# Patient Record
Sex: MALE
[De-identification: names, ages, dates, MRNs, and addresses within clinical notes are randomized per-mention and may not be internally consistent; named-entity substitution may affect disease eponyms.]

---

## 2020-12-21 ENCOUNTER — APPOINTMENT (OUTPATIENT)
Dept: DERMATOLOGY | Facility: CLINIC | Age: 65
End: 2020-12-21
Payer: COMMERCIAL

## 2020-12-21 DIAGNOSIS — D22.71 MELANOCYTIC NEVI OF RIGHT LOWER LIMB, INCLUDING HIP: ICD-10-CM

## 2020-12-21 DIAGNOSIS — Z80.8 FAMILY HISTORY OF MALIGNANT NEOPLASM OF OTHER ORGANS OR SYSTEMS: ICD-10-CM

## 2020-12-21 DIAGNOSIS — L82.1 OTHER SEBORRHEIC KERATOSIS: ICD-10-CM

## 2020-12-21 DIAGNOSIS — L11.1 TRANSIENT ACANTHOLYTIC DERMATOSIS [GROVER]: ICD-10-CM

## 2020-12-21 PROCEDURE — 99203 OFFICE O/P NEW LOW 30 MIN: CPT

## 2020-12-21 PROCEDURE — 99072 ADDL SUPL MATRL&STAF TM PHE: CPT

## 2020-12-21 RX ORDER — ROSUVASTATIN CALCIUM 5 MG/1
TABLET, FILM COATED ORAL
Refills: 0 | Status: ACTIVE | COMMUNITY

## 2020-12-21 NOTE — ASSESSMENT
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

## 2020-12-21 NOTE — HISTORY OF PRESENT ILLNESS
[FreeTextEntry1] : Evaluation of growths [de-identified] : First visit for 65-year-old white male presented for evaluation of growths. Particularly concerned about a lesion on the chest.  No history of skin cancer.

## 2020-12-21 NOTE — PHYSICAL EXAM
[Alert] : alert [Oriented x 3] : ~L oriented x 3 [Well Nourished] : well nourished [FreeTextEntry3] : The following areas were examined and no significant abnormalities were seen except as noted below:\par \par Type I skin\par \par scalp, face, eyelids, nose, lips, ears, neck, chest, abdomen, back,groin, buttocks, right arm, left arm, right hand, left hand,\par right  leg, left leg, right foot, left foot\par \par Midchest: Moderate multiple 3-5 mm ill-defined pink macules and papules\par Left arm: 4 mm brown verrucous papule\par Few similar lesions present on the right upper back\par Left upper lateral hip: 5 x 4 mm brown macule-not suspicious on dermoscopy\par \par